# Patient Record
Sex: FEMALE | Race: BLACK OR AFRICAN AMERICAN | NOT HISPANIC OR LATINO | Employment: FULL TIME | URBAN - METROPOLITAN AREA
[De-identification: names, ages, dates, MRNs, and addresses within clinical notes are randomized per-mention and may not be internally consistent; named-entity substitution may affect disease eponyms.]

---

## 2023-09-05 LAB — SARS-COV-2 RESULT: NOT DETECTED

## 2024-08-22 ENCOUNTER — TELEMEDICINE (OUTPATIENT)
Dept: ENDOCRINOLOGY | Facility: CLINIC | Age: 49
End: 2024-08-22
Payer: COMMERCIAL

## 2024-08-22 DIAGNOSIS — K12.2 MOUTH ABSCESS: Primary | ICD-10-CM

## 2024-08-22 PROCEDURE — 99203 OFFICE O/P NEW LOW 30 MIN: CPT | Performed by: NURSE PRACTITIONER

## 2024-08-22 PROCEDURE — 1036F TOBACCO NON-USER: CPT | Performed by: NURSE PRACTITIONER

## 2024-08-22 RX ORDER — FLUTICASONE PROPIONATE 50 MCG
2 SPRAY, SUSPENSION (ML) NASAL DAILY
COMMUNITY
Start: 2020-03-24

## 2024-08-22 RX ORDER — AMOXICILLIN 500 MG/1
500 TABLET, FILM COATED ORAL EVERY 8 HOURS SCHEDULED
Qty: 42 TABLET | Refills: 0 | Status: SHIPPED | OUTPATIENT
Start: 2024-08-22 | End: 2024-09-05

## 2024-08-22 RX ORDER — IBUPROFEN 800 MG/1
TABLET ORAL
COMMUNITY
Start: 2020-03-25 | End: 2024-08-22 | Stop reason: ALTCHOICE

## 2024-08-22 RX ORDER — ONDANSETRON 4 MG/1
TABLET, FILM COATED ORAL
COMMUNITY
Start: 2020-09-03

## 2024-08-22 RX ORDER — PANTOPRAZOLE SODIUM 20 MG/1
1 TABLET, DELAYED RELEASE ORAL DAILY
COMMUNITY
Start: 2020-09-03

## 2024-08-22 RX ORDER — GABAPENTIN 100 MG/1
CAPSULE ORAL
COMMUNITY
Start: 2020-03-24

## 2024-08-22 RX ORDER — IBUPROFEN 600 MG/1
600 TABLET ORAL EVERY 8 HOURS PRN
Qty: 90 TABLET | Refills: 1 | Status: SHIPPED | OUTPATIENT
Start: 2024-08-22 | End: 2024-10-21

## 2024-08-22 ASSESSMENT — ENCOUNTER SYMPTOMS
BACK PAIN: 0
FLANK PAIN: 0
DIZZINESS: 0
HEADACHES: 0
PHOTOPHOBIA: 0
EYE PAIN: 0
FATIGUE: 0
RHINORRHEA: 0
EYE DISCHARGE: 0
DIARRHEA: 0
ACTIVITY CHANGE: 0
SPEECH DIFFICULTY: 0
CONSTIPATION: 0
POLYPHAGIA: 0
NUMBNESS: 0
COUGH: 0
POLYDIPSIA: 0
FREQUENCY: 0
ABDOMINAL DISTENTION: 0

## 2024-08-22 NOTE — PROGRESS NOTES
Chase Dixon presents for weight loss management and obesity consult today. Presents with swelling left maxillary area. The swelling began 2 days ago and has increased. The area is tender. There is pain, she feels an abscess in the area and then saw it the abscess. It is affecting the ability to eat due to tenderness. She has no fever or chills. NKDA          History reviewed. No pertinent past medical history.    Current Outpatient Medications   Medication Sig Dispense Refill    fluticasone (Flonase) 50 mcg/actuation nasal spray Administer 2 sprays into affected nostril(s) once daily.      gabapentin (Neurontin) 100 mg capsule Take by mouth.      ondansetron (Zofran) 4 mg tablet Take by mouth.      pantoprazole (ProtoNix) 20 mg EC tablet Take 1 tablet (20 mg) by mouth once daily.      amoxicillin (Amoxil) 500 mg tablet Take 1 tablet (500 mg) by mouth every 8 hours for 14 days. 42 tablet 0    ibuprofen 600 mg tablet Take 1 tablet (600 mg) by mouth every 8 hours if needed for moderate pain (4 - 6) (tooth pain). 90 tablet 1     No current facility-administered medications for this visit.           Review of Systems  Review of Systems   Constitutional:  Negative for activity change and fatigue.   HENT:  Negative for congestion, dental problem, ear pain, mouth sores and rhinorrhea.         Left maxillary and buccal tenderness    Eyes:  Negative for photophobia, pain and discharge.   Respiratory:  Negative for cough.    Cardiovascular:  Negative for leg swelling.   Gastrointestinal:  Negative for abdominal distention, constipation and diarrhea.   Endocrine: Negative for polydipsia, polyphagia and polyuria.   Genitourinary:  Negative for flank pain, frequency and urgency.   Musculoskeletal:  Negative for back pain.   Skin:  Negative for pallor and rash.   Neurological:  Negative for dizziness, speech difficulty, numbness and headaches.   Psychiatric/Behavioral:  Negative for behavioral problems.   "          Objective   There were no vitals taken for this visit.    Physical Exam  Physical Exam  HENT:      Mouth/Throat:      Comments: Swelling left buccal area, tender, no drainage, small abscess is noted  Left maxillary swelling, no heat  Neck:      Comments: Palpable nose left submandibular and 1st cervical node left neck  Cardiovascular:      Rate and Rhythm: Normal rate.      Pulses: Normal pulses.   Neurological:      Mental Status: She is alert.         Lab Review  No results found for: \"HGBA1C\"  No results found for: \"LDLCALC\"    Weight Trends  Trends of weight reviewed in visit, see graph below:  Wt Readings from Last 3 Encounters:   09/24/21 60.3 kg (133 lb)   03/24/20 71.7 kg (158 lb)   (  Assessment/Plan     Follow up and Goals:  ..  Problem List Items Addressed This Visit    None  Visit Diagnoses       Mouth abscess    -  Primary    Relevant Medications    amoxicillin (Amoxil) 500 mg tablet    ibuprofen 600 mg tablet          Schedule with dentist in next 24-48 hours.  [facility] [phone]      "

## 2024-08-22 NOTE — PATIENT INSTRUCTIONS
..  Problem List Items Addressed This Visit    None  Visit Diagnoses       Mouth abscess    -  Primary    Relevant Medications    amoxicillin (Amoxil) 500 mg tablet    ibuprofen 600 mg tablet          Schedule with dentist in next 24-48 hours.  [facility] [phone]